# Patient Record
Sex: FEMALE | Race: WHITE
[De-identification: names, ages, dates, MRNs, and addresses within clinical notes are randomized per-mention and may not be internally consistent; named-entity substitution may affect disease eponyms.]

---

## 2018-03-19 ENCOUNTER — HOSPITAL ENCOUNTER (INPATIENT)
Dept: HOSPITAL 25 - ED | Age: 83
LOS: 3 days | Discharge: NURSING, REHAB OR LONG-TERM CARE | DRG: 57 | End: 2018-03-22
Attending: HOSPITALIST | Admitting: HOSPITALIST
Payer: SELF-PAY

## 2018-03-19 ENCOUNTER — HOSPITAL ENCOUNTER (EMERGENCY)
Dept: HOSPITAL 25 - UCEAST | Age: 83
Discharge: HOME | End: 2018-03-19
Payer: SELF-PAY

## 2018-03-19 VITALS — DIASTOLIC BLOOD PRESSURE: 94 MMHG | SYSTOLIC BLOOD PRESSURE: 190 MMHG

## 2018-03-19 DIAGNOSIS — I10: ICD-10-CM

## 2018-03-19 DIAGNOSIS — Z87.891: ICD-10-CM

## 2018-03-19 DIAGNOSIS — I16.0: Primary | ICD-10-CM

## 2018-03-19 DIAGNOSIS — G30.9: Primary | ICD-10-CM

## 2018-03-19 DIAGNOSIS — F03.90: ICD-10-CM

## 2018-03-19 DIAGNOSIS — R32: ICD-10-CM

## 2018-03-19 DIAGNOSIS — N39.0: ICD-10-CM

## 2018-03-19 DIAGNOSIS — I48.0: ICD-10-CM

## 2018-03-19 DIAGNOSIS — I16.0: ICD-10-CM

## 2018-03-19 DIAGNOSIS — R15.9: ICD-10-CM

## 2018-03-19 DIAGNOSIS — F02.81: ICD-10-CM

## 2018-03-19 DIAGNOSIS — Z81.8: ICD-10-CM

## 2018-03-19 LAB
BASOPHILS # BLD AUTO: 0 10^3/UL (ref 0–0.2)
EOSINOPHIL # BLD AUTO: 0.1 10^3/UL (ref 0–0.6)
HCT VFR BLD AUTO: 41 % (ref 35–47)
HGB BLD-MCNC: 14.1 G/DL (ref 12–16)
INR PPP/BLD: 1.01 (ref 0.77–1.02)
LYMPHOCYTES # BLD AUTO: 1.5 10^3/UL (ref 1–4.8)
MCH RBC QN AUTO: 31 PG (ref 27–31)
MCHC RBC AUTO-ENTMCNC: 34 G/DL (ref 31–36)
MCV RBC AUTO: 90 FL (ref 80–97)
MONOCYTES # BLD AUTO: 0.3 10^3/UL (ref 0–0.8)
NEUTROPHILS # BLD AUTO: 3.1 10^3/UL (ref 1.5–7.7)
NRBC # BLD AUTO: 0 10^3/UL
NRBC BLD QL AUTO: 0
PLATELET # BLD AUTO: 144 10^3/UL (ref 150–450)
RBC # BLD AUTO: 4.59 10^6/UL (ref 4–5.4)
WBC # BLD AUTO: 5 10^3/UL (ref 3.5–10.8)

## 2018-03-19 PROCEDURE — 80053 COMPREHEN METABOLIC PANEL: CPT

## 2018-03-19 PROCEDURE — G0463 HOSPITAL OUTPT CLINIC VISIT: HCPCS

## 2018-03-19 PROCEDURE — 93306 TTE W/DOPPLER COMPLETE: CPT

## 2018-03-19 PROCEDURE — 99284 EMERGENCY DEPT VISIT MOD MDM: CPT

## 2018-03-19 PROCEDURE — 93005 ELECTROCARDIOGRAM TRACING: CPT

## 2018-03-19 PROCEDURE — 81003 URINALYSIS AUTO W/O SCOPE: CPT

## 2018-03-19 PROCEDURE — 36415 COLL VENOUS BLD VENIPUNCTURE: CPT

## 2018-03-19 PROCEDURE — 84484 ASSAY OF TROPONIN QUANT: CPT

## 2018-03-19 PROCEDURE — 87086 URINE CULTURE/COLONY COUNT: CPT

## 2018-03-19 PROCEDURE — 84443 ASSAY THYROID STIM HORMONE: CPT

## 2018-03-19 PROCEDURE — 99202 OFFICE O/P NEW SF 15 MIN: CPT

## 2018-03-19 PROCEDURE — 83735 ASSAY OF MAGNESIUM: CPT

## 2018-03-19 PROCEDURE — 71045 X-RAY EXAM CHEST 1 VIEW: CPT

## 2018-03-19 PROCEDURE — 85730 THROMBOPLASTIN TIME PARTIAL: CPT

## 2018-03-19 PROCEDURE — 85025 COMPLETE CBC W/AUTO DIFF WBC: CPT

## 2018-03-19 PROCEDURE — 85610 PROTHROMBIN TIME: CPT

## 2018-03-19 NOTE — RAD
INDICATION: Chest pain     



COMPARISON: None

 

TECHNIQUE: An AP portable view obtained at 2025 hours is submitted.



FINDINGS: 



Bones/Soft Tissues: There are no acute bony findings.    



Cardiomediastinal: The cardiomediastinal silhouette is normal. 



Lungs: There are no infiltrates.



Pleura: There are no pleural effusions. 



Other: None



IMPRESSION:  NO ACTIVE DISEASE.

## 2018-03-19 NOTE — ED
Isadora HERNANDES Thomas, scribed for Howard Abbasi MD on 03/19/18 at 1941 .





HPI Chest Pain





- HPI Summary


HPI Summary: 





The patient is an 82 year old female transferred from urgent care with chest 

pains. It is unclear when the chest pain began. In the emergency department, 

the patient denies any chest pain. At baseline, the patient is confused due to 

Alzheimers, and the history is obtained from her daughter. At urgent care, the 

patient was found to have elevated blood pressure and a UTI, and she was 

transferred to the emergency department. Per daughter, the patient has not been 

to a physician in 25 years. Her past medical history is unknown. The family 

requests a social work consult. 





LEVEL 5 CAVEAT: HPI limited by dementia. 





- History of Current Complaint


Chief Complaint: EDUrogenitalProblems


Time Seen by Provider: 03/19/18 19:27


Hx Obtained From: Family/Caretaker


Hx From Patient Unobtainable Due To: Dementia


Onset/Duration: Resolved


Current Severity: None


Pain Intensity: 0


Pain Scale Used: 0-10 Numeric


Alleviating Factor(s): Spontaneous Resolution


Associated Signs and Symptoms: Positive: Chest Pain.  Negative: Fever





- Allergy/Home Medications


Allergies/Adverse Reactions: 


 Allergies











Allergy/AdvReac Type Severity Reaction Status Date / Time


 


No Known Allergies Allergy   Verified 03/19/18 16:25














PMH/Surg Hx/FS Hx/Imm Hx


Cardiovascular History: Reports: Hx Hypertension


Neurological History: Reports: Hx Dementia


Infectious Disease History: No


Infectious Disease History: 


   Denies: Traveled Outside the [place] in Last 30 Days





- Family History


Known Family History: Positive: Unknown - LEVEL 5 CAVEAT: HPI limited by 

dementia





- Social History


Alcohol Use: None


Substance Use Type: Reports: None


Hx Tobacco Use: Yes


Smoking Status (MU): Former Smoker





Review of Systems


Negative: Fever


Positive: Chest Pain


All Other Systems Reviewed And Are Negative: No





- Comments


Additional Review of Systems Comments: 





LEVEL 5 CAVEAT: ROS limited by dementia. 





Physical Exam





- Summary


Physical Exam Summary: 





VITAL SIGNS: Reviewed.


GENERAL: Patient is an elderly female who is lying comfortable in the stretcher 

in no acute distress. She is a poor historian. Patient is not in any acute 

respiratory distress.


HEAD AND FACE: No signs of trauma. No ecchymosis, hematomas or skull 

depressions. No sinus tenderness.


EYES: PERRLA, EOMI x 2, No injected conjunctiva, no nystagmus.


EARS: Hearing grossly intact. Ear canals and tympanic membranes are within 

normal limits.


MOUTH: Oropharynx within normal limits.


NECK: Supple, trachea is midline, no adenopathy, no JVD, no carotid bruit, no c-

spine tenderness, neck with full ROM.


CHEST: Symmetric, no tenderness at palpation


LUNGS: Clear to auscultation bilaterally. No wheezing or crackles.


CVS: Regular rate and rhythm, S1 and S2 present, no murmurs or gallops 

appreciated.


ABDOMEN: Soft, non-tender. No signs of distention. No rebound no guarding, and 

no masses palpated. Bowel sounds are normal.


EXTREMITIES: She has trace pitting edema bilaterally. FROM in all major joints, 

no cyanosis or clubbing.


NEURO: Alert and oriented to person only. No acute neurological deficits. 

Speech is normal and follows commands.


SKIN: Dry and warm





LEVEL 5 CAVEAT: physical exam limited by dementia. 


Triage Information Reviewed: Yes


Vital Signs On Initial Exam: 


 Initial Vitals











Temp Pulse Resp BP Pulse Ox


 


 98.3 F   64   16   193/89   99 


 


 03/19/18 19:04  03/19/18 19:04  03/19/18 19:04  03/19/18 19:04  03/19/18 19:04











Vital Signs Reviewed: Yes


Completion Of Physical Exam Limited Due To: Dementia





Diagnostics





- Vital Signs


 Vital Signs











  Temp Pulse Resp BP Pulse Ox


 


 03/19/18 19:31   68    98


 


 03/19/18 19:04  98.3 F  64  16  193/89  99














- Laboratory


Result Diagrams: 


 03/19/18 20:13





 03/19/18 20:13


Lab Statement: Any lab studies that have been ordered have been reviewed, and 

results considered in the medical decision making process.





- Radiology


  ** CXR


Xray Interpretation: No Acute Changes - NO ACTIVE DISEASE. Dr. Abbasi has 

reviewed this report.


Radiology Interpretation Completed By: Radiologist





- EKG


  ** 19:54


Cardiac Rate: NL


EKG Rhythm: Sinus Rhythm - at 67 BPM


EKG Interpretation: Nonspecific T waves in inferior leads. 





Re-Evaluation





- Re-Evaluation


  ** First Eval


Re-Evaluation Time: 21:41


Comment: Discussed results. Patient will be admitted.





Chest Pain Course/Dx





- Course


Assessment/Plan: The patient is an 82 year old female with a history of 

dementia transferred from urgent care with chest pains. In the ED course the 

patient was given ASA and Labetalol. Bloodwork and urinalysis were obtained. 

EKG and CXR were obtained. The patient is diagnosed with hypertension and chest 

pain. She will be admitted by Dr. iSgala.





- Diagnoses


Provider Diagnoses: 


 Hypertension, Chest pain








- Provider Notifications


Discussed Care Of Patient With: Nora Sigala


Time Discussed With Above Provider: 21:40


Instructed by Provider To: Admit As Inpatient





Discharge





- Discharge Plan


Condition: Stable


Disposition: ADMITTED TO [facility]


Referrals: 


No Primary Care Phys,NOPCP [Primary Care Provider] - 





The documentation as recorded by the Isadora hein Thomas accurately reflects 

the service I personally performed and the decisions made by me, Howard Abbasi MD.

## 2018-03-20 RX ADMIN — AMLODIPINE BESYLATE SCH MG: 5 TABLET ORAL at 00:17

## 2018-03-20 RX ADMIN — ASPIRIN SCH MG: 81 TABLET, COATED ORAL at 08:27

## 2018-03-20 RX ADMIN — Medication SCH MG: at 23:30

## 2018-03-20 RX ADMIN — HEPARIN SODIUM SCH UNITS: 5000 INJECTION INTRAVENOUS; SUBCUTANEOUS at 05:29

## 2018-03-20 RX ADMIN — HEPARIN SODIUM SCH UNITS: 5000 INJECTION INTRAVENOUS; SUBCUTANEOUS at 14:09

## 2018-03-20 RX ADMIN — HEPARIN SODIUM SCH UNITS: 5000 INJECTION INTRAVENOUS; SUBCUTANEOUS at 21:29

## 2018-03-20 RX ADMIN — AMLODIPINE BESYLATE SCH MG: 5 TABLET ORAL at 08:27

## 2018-03-20 NOTE — HP
HISTORY AND PHYSICAL:

 

DATE OF ADMISSION:  18

 

TIME OF EVALUATION:  0.

 

PRIMARY CARE PROVIDER:  The patient does not have a primary care physician.

 

CHIEF COMPLAINT:  Concern for inability to be cared for at home.

 

HISTORY OF PRESENT ILLNESS:  This is an 82-year-old female with advanced 
Alzheimer's who presented to the emergency room from urgent care for concern of 
urosepsis, hypertensive urgency, incoordination of nursing home placement.  The 
family has provided the history.  The daughter is at the bedside as the patient 
is unable to provide this due to her advanced dementia.  The patient lives with 
her handicapped son.  The son called because the patient was complaining of 
chest pain. The daughter came over.  She appeared sweaty, complaining of chest 
pain at that time.  They brought her to urgent care. They noted that her blood 
pressure was elevated.  They were concerned about a urinary tract infection.  
They sent her the emergency room for further evaluation.  On my encounter, the 
patient any pain.  No shortness of breath. She was alert and oriented x1.  They 
state she has not seen a doctor in years that she has lost a significant amount 
for weight since she has been diagnosed with Alzheimer's.  In terms of her 
Alzheimer's, she does not recognize anyone.  She is unable to express her 
needs.  She does have trouble with eating.  They have to get soft food for her.
  No recent URI illness.  No fever.  No nausea, vomiting, or diarrhea per 
family.  I discussed with the family about them taking her home and caring for 
her.  They state they are unable to as they have to care of the son as well.  
Otherwise, unable to obtain review of systems due to the patient's advanced 
dementia.

 

PAST MEDICAL HISTORY:  History of Alzheimer's disease, appears to be advanced.

 

MEDICATIONS:  None.

 

FAMILY HISTORY:  Mother  from dementia.  Father  from unknown 
cause.

 

SOCIAL HISTORY:  She lives at home with her son who has a developmental 
disability. She does ambulate, but unable to express her needs.  She is 
incontinent of stool and urine.  She quit smoking as the family states she 
forgot to continue this.  She used 2 to 3 packs per day for many years.  No 
alcohol use.  She does not have a healthcare proxy.  The daughter has been in 
the process of trying to pursue this. Her name is Rita Suarez.  Code status as 
of now is full code.

 

REVIEW OF SYSTEMS:  Unable to obtain, limited due to patient's dementia.

 

                               PHYSICAL EXAMINATION

 

GENERAL:  No acute distress, resting comfortably with her family at the bedside.

 

VITAL SIGNS:  Temp 98.3, pulse  is 68, respiratory rate is 16, oxygen 
saturation is 97% on room air, blood pressure 155/65.

 

HEENT:  Head normocephalic.  Pupils equal and reactive, anicteric.  Oropharynx: 
Mucous membranes moist.

 

NECK:  Supple, no lymphadenopathy.

 

RESPIRATORY:  Diminished breath sounds.  No wheezes, rhonchi, or rales.

 

CARDIAC:  Regular rate and rhythm.  Soft systolic murmur heard throughout.

 

ABDOMEN:  Soft, nontender, and nondistended.

 

EXTREMITIES:  +1 nonpitting edema, +1 DPs.  She has chronic nail onychomycosis 
in nails of both lower extremities.

 

NEUROLOGIC:  No gross focal neurologic deficits.  Alert and oriented x1.

 

 LABORATORY DATA:  White count is 5, hemoglobin 14.1, hematocrit 41, and  
platelets 144.  INR is 1.01.  Sodium 138, potassium 3.5, chloride 104, bicarb 30
, BUN 12, creatinine 0.76, glucose 156. Troponin is 0.  Albumin is 4.  
Urinalysis was unremarkable in the emergency room and in urgent care it shows 2
+ leukocytes.

 

RADIOGRAPHIC DATA:  Chest x-ray shows no active disease.  EKG shows normal 
sinus rhythm.

 

ASSESSMENT:  This is an 82-year-old female with advanced dementia  who presents 
to the emergency room from urgent care for concern of needing a higher level of 
care.

 

1.  Advanced dementia.  Assessment:  The patient's UA is unremarkable.  She 
does not have evidence of urosepsis as mentioned by urgent care.  She did have 
chest pain prior to arrival.  I do not think it is unreasonable to trend her 
troponin.  I would not pursue further workup.  We will place a social work 
consult to help with coordination of care and monitor her blood pressures.  We 
will hold off on starting her on an antihypertensive at this time.  We will 
start her on a baby aspirin.

2.  FEN:  Regular soft diet.

3.  DVT prophylaxis:  The patient scores high risk.  Placed her on heparin 
subcu t.i.d.

4.  Code status:  Right now is full code.  She needs her proxy filled out, so 
they can update her MOLST form.

 

TIME SPENT:  Greater than 45 minutes spent doing history and physical, more 
than half time spent in direct patient contact.

 

[id number]/CPS #: [id number]

NORMAN

## 2018-03-20 NOTE — PN
Subjective


Date of Service: 03/20/18


Interval History: 





She has been active this morning, tries to get out of bed frequently, but she 

has no complaints.  She does not know where she is, and when I tell her she is 

in the hospital, she does not know why.  She feels good and requests breakfast.

  No chest pain, shortness of breath, nausea, abdominal pain, fevers.





Objective


Active Medications: 








Acetaminophen (Tylenol Tab*)  650 mg PO Q4H PRN


   PRN Reason: FEVER/PAIN


Al Hydrox/Mg Hydrox/Simethicone (Maalox Plus*)  30 ml PO Q6H PRN


   PRN Reason: INDIGESTION


Amlodipine Besylate (Norvasc Tab*)  5 mg PO DAILY [facility]


   Last Admin: 03/20/18 08:27 Dose:  5 mg


Aspirin (Aspirin Ec Low Dose*)  81 mg PO DAILY [facility]


   Last Admin: 03/20/18 08:27 Dose:  81 mg


Docusate Sodium (Colace Cap*)  100 mg PO BID PRN


   PRN Reason: CONSTIPATION


Heparin Sodium (Porcine) (Heparin Vial(*))  5,000 units SUBCUT Q8HR [facility]


   Last Admin: 03/20/18 05:29 Dose:  5,000 units


Ondansetron HCl (Zofran Inj*)  4 mg IV Q4H PRN


   PRN Reason: NAUSEA/VOMITING


Senna (Senokot Tab*)  1 tab PO BID PRN


   PRN Reason: CONSTIPATION








 Vital Signs - 8 hr











  03/20/18 03/20/18





  03:18 07:43


 


Temperature 96.6 F 97.5 F


 


Pulse Rate 55 51


 


Respiratory 16 18





Rate  


 


Blood Pressure 108/46 120/48





(mmHg)  


 


O2 Sat by Pulse 100 99





Oximetry  











Oxygen Devices in Use Now: None


Appearance: alert, comfortable, male-pattern facial hair


Eyes: No Scleral Icterus


Ears/Nose/Mouth/Throat: - - poor dentition


Neck: NL Appearance and Movements; NL JVP


Respiratory: Symmetrical Chest Expansion and Respiratory Effort, Clear to 

Auscultation


Cardiovascular: NL Sounds; No Murmurs; No JVD, RRR


Abdominal: NL Sounds; No Tenderness; No Distention


Lymphatic: No Cervical Adenopathy


Extremities: No Edema


Skin: No Rash or Ulcers


Neurological: - - oriented only to person, cannot tell me the date, place, or 

her family members' names.  follows simple commands, poor short term and long 

term recall.


Result Diagrams: 


 03/19/18 20:13





 03/19/18 20:13





Assess/Plan/Problems-Billing


Assessment: 





83 yo female with history of alzheimer dementia who is cared for by her son who 

has special needs, presented from urgent care with concern for hypertensive 

urgency and inability to care for herself at home. 





- Patient Problems


(1) Alzheimer's dementia with behavioral disturbance


Current Visit: Yes   Status: Acute   Code(s): G30.9 - ALZHEIMER'S DISEASE, 

UNSPECIFIED; F02.81 - DEMENTIA IN OTH DISEASES CLASSD ELSWHR W BEHAVIORAL 

DISTURB   SNOMED Code(s): 4040099577660


   Comment: appears advanced.  her daughter reported to the ED that she is 

unable to care for herself at home at this time, need to discuss this with her.

  CM to pursue nursing home placement.    





(2) Hypertensive urgency


Current Visit: Yes   Status: Acute   Code(s): I16.0 - HYPERTENSIVE URGENCY   

SNOMED Code(s): 164228944


   Comment: she received labetalol 10mg IV in the ED; responded very quickly--

will not give any more iv antihypertensives given her sensitivity to them


she is normotensive now 


no evidence of end-organ damage   





(3) HTN (hypertension)


Current Visit: Yes   Status: Acute   Code(s): I10 - ESSENTIAL (PRIMARY) 

HYPERTENSION   SNOMED Code(s): 43580826


   Comment: amlodipine begun last night; will follow

## 2018-03-21 RX ADMIN — ASPIRIN SCH: 81 TABLET, COATED ORAL at 08:58

## 2018-03-21 RX ADMIN — HEPARIN SODIUM SCH UNITS: 5000 INJECTION INTRAVENOUS; SUBCUTANEOUS at 05:38

## 2018-03-21 RX ADMIN — Medication SCH MG: at 20:49

## 2018-03-21 RX ADMIN — HEPARIN SODIUM SCH UNITS: 5000 INJECTION INTRAVENOUS; SUBCUTANEOUS at 13:45

## 2018-03-21 RX ADMIN — AMLODIPINE BESYLATE SCH: 5 TABLET ORAL at 08:58

## 2018-03-21 RX ADMIN — HEPARIN SODIUM SCH: 5000 INJECTION INTRAVENOUS; SUBCUTANEOUS at 20:56

## 2018-03-21 NOTE — PN
Subjective


Date of Service: 03/21/18


Interval History: 





she was noted to be in atrial fibrillation overnight.  no rate control was 

required, and she returned to normal sinus rhythm spontaneously.  she has no 

complaints, says she just wants to "get home."  





Objective


Active Medications: 








Acetaminophen (Tylenol Tab*)  650 mg PO Q4H PRN


   PRN Reason: FEVER/PAIN


Al Hydrox/Mg Hydrox/Simethicone (Maalox Plus*)  30 ml PO Q6H PRN


   PRN Reason: INDIGESTION


Amlodipine Besylate (Norvasc Tab*)  5 mg PO DAILY [facility]


   Last Admin: 03/21/18 08:58 Dose:  Not Given


Aspirin (Aspirin Ec Low Dose*)  81 mg PO DAILY [facility]


   Last Admin: 03/21/18 08:58 Dose:  Not Given


Docusate Sodium (Colace Cap*)  100 mg PO BID PRN


   PRN Reason: CONSTIPATION


Heparin Sodium (Porcine) (Heparin Vial(*))  5,000 units SUBCUT Q8HR [facility]


   Last Admin: 03/21/18 13:45 Dose:  5,000 units


Melatonin (Melatonin (Nf))  3 mg PO BEDTIME [facility]


   Last Admin: 03/20/18 23:30 Dose:  3 mg


Ondansetron HCl (Zofran Inj*)  4 mg IV Q4H PRN


   PRN Reason: NAUSEA/VOMITING


Senna (Senokot Tab*)  1 tab PO BID PRN


   PRN Reason: CONSTIPATION








 Vital Signs - 8 hr











  03/21/18 03/21/18 03/21/18





  08:53 09:36 11:23


 


Temperature 97.0 F  97.9 F


 


Pulse Rate 67  64


 


Respiratory 18 18 16





Rate   


 


Blood Pressure 145/54  136/74





(mmHg)   


 


O2 Sat by Pulse 99  99





Oximetry   











Oxygen Devices in Use Now: None


Appearance: alert, sitting up in bed eating lunch, disoriented


Eyes: No Scleral Icterus


Ears/Nose/Mouth/Throat: NL Teeth, Lips, Gums


Neck: NL Appearance and Movements; NL JVP


Respiratory: Symmetrical Chest Expansion and Respiratory Effort, Clear to 

Auscultation


Cardiovascular: NL Sounds; No Murmurs; No JVD, RRR


Abdominal: NL Sounds; No Tenderness; No Distention


Lymphatic: No Cervical Adenopathy


Extremities: No Edema


Skin: No Rash or Ulcers


Neurological: - - oriented only to person


Result Diagrams: 


 03/19/18 20:13





 03/19/18 20:13





Assess/Plan/Problems-Billing


Assessment: 





81 yo female with history of alzheimer dementia who is cared for by her son who 

has special needs, presented from urgent care with concern for hypertensive 

urgency and inability to care for herself at home. 





- Patient Problems


(1) Alzheimer's dementia with behavioral disturbance


Current Visit: Yes   Status: Acute   Code(s): G30.9 - ALZHEIMER'S DISEASE, 

UNSPECIFIED; F02.81 - DEMENTIA IN OTH DISEASES CLASSD ELSWHR W BEHAVIORAL 

DISTURB   SNOMED Code(s): 1545290134966


   Comment: plan for [facility] tomorrow; she is unable to go back home 

because she cannot care for herself   





(2) Hypertensive urgency


Current Visit: Yes   Status: Acute   Code(s): I16.0 - HYPERTENSIVE URGENCY   

SNOMED Code(s): 555239725


   Comment: received labetalol 10mg IV in the ed and was very sensitive to it


she is normotensive now 


no evidence of end-organ damage   





(3) Paroxysmal atrial fibrillation


Current Visit: Yes   Status: Acute   Code(s): I48.0 - PAROXYSMAL ATRIAL 

FIBRILLATION   SNOMED Code(s): 102321687


   Comment: resolved this morning. I have discussed this with her daughter and 

son-in-law and the risk of cva.  I also discussed risks and benefits of 

anticoagulation with them.  They are unsure, so I will check a TTE to help 

define risks/benefits of anticoagulation.     





(4) HTN (hypertension)


Current Visit: Yes   Status: Acute   Code(s): I10 - ESSENTIAL (PRIMARY) 

HYPERTENSION   SNOMED Code(s): 86795701


   Comment: well controlled on amlodipine

## 2018-03-21 NOTE — PN
Progress Note





- Progress Note


Date of Service: 03/21/18


Note: 





Patient noted to be in Afib - confirmed by EKG.  Not a candidate for 

anticoagulation in setting of being a high fall risk with advanced dementia.  

Currently rate controlled.  Continue ASA 81 mg.

## 2018-03-22 VITALS — DIASTOLIC BLOOD PRESSURE: 76 MMHG | SYSTOLIC BLOOD PRESSURE: 152 MMHG

## 2018-03-22 RX ADMIN — ASPIRIN SCH MG: 81 TABLET, COATED ORAL at 09:05

## 2018-03-22 RX ADMIN — HEPARIN SODIUM SCH: 5000 INJECTION INTRAVENOUS; SUBCUTANEOUS at 06:34

## 2018-03-22 RX ADMIN — AMLODIPINE BESYLATE SCH MG: 5 TABLET ORAL at 09:04

## 2018-03-22 NOTE — DS
DATE OF ADMISSION:  03/19/2018.

 

DATE OF DISCHARGE:  03/22/2018.

 

PRINCIPAL DISCHARGE DIAGNOSES:

1.  Advanced dementia.

2.  Atrial fibrillation.

 

PHYSICAL EXAMINATION AT THE TIME OF DISCHARGE: General:  Alert, elderly female 
in no distress, walking the hallway frequently, oriented to self only.  Vital 
Signs:  Temperature 97.5, heart rate 66, blood pressure 152/76, pulse ox 100 
percent on room air, respiratory rate 16.  HEENT: Pupils 3 mm bilaterally and 
reactive to light.  No nystagmus.  Moist mucosa.  No pharyngeal exudates or 
erythema.  Neck:  No JVP, no cervical lymphadenopathy. Chest:  Regular rate and 
rhythm. No murmurs. PMI nondisplaced.  Lungs: Clear bilaterally.  Abdomen: Soft
, nontender, nondistended. Extremities:  No edema, no rashes, no ulcers.  
Neurologic:  Strength 5+ throughout.  Follows simple commands. Coordination 
intact.

 

HOSPITAL COURSE BY PROBLEM:

1.  Advanced dementia:  Ms. Munoz was admitted to the hospital due to her 
family's inability to care for her at home.  She lived only with her son who 
has special needs and her daughter reported that she was no longer safe at home 
and that their family could not offer the level of care that she required.  She 
was admitted under [facility] admission and is being discharged to [facility].

 

2.  Hypertension:  She was found to be in hypertensive urgency in the emergency 
department.  She responded very sensitively to Labetalol 10 mg IV.  She was 
started on Amlodipine 5 mg daily and has had good control with this 
prescription which is new for her.  She is being discharged on Amlodipine 5 mg 
daily.

 

3.  New onset atrial fibrillation:  She was noted to be in A-fib with RVR on 
telemetry during this admission.  She has never been found to have A-fib or 
told that she had is before.  She did require IV Metoprolol, but converted to 
normal sinus rhythm on her own.  This was discussed at length with her daughter
, Rita, and her risk for stroke.  An echocardiogram was performed that showed 
no valvular abnormalities.  Her daughter, Rita, reports that Ms. Munoz's 
livelihood is based heavily on her activity level while she is in the hospital, 
even she is witnessed to be doing laps around the hallways all day long further 
confirming that her ambulation is very important to her.  Her daughter and I 
discussed that a stroke could be devastating to Ms. Munoz.  We also 
extensively discussed the risks of bleeding, especially in an elderly female 
with advanced dementia; however, at this time, given her elevated CHADS-2 VASc 
score and low HAS-BLED score, her daughter has elected to continue with 
Eliquis.  She qualifies for low dose Eliquis at 2.5 mg b.i.d. and will be 
discharged on this medications which is new for her.  She required no rate 
controlling medications after she converted.

 

4.  Disposition:  Ms. Munoz is being discharged to [facility] on 03
/22/2018 with her daughter Rita at the bedside.

 

FOLLOW-UP NEEDED:  Ms. Munoz should be re-evaluated regularly for the safety 
of a novel oral anticoagulant and the risks versus benefits should be routinely 
readdressed with her family as her dementia advances.  This has been discussed 
with her daughter.

 

 [id number]/CPS #: [id number]

MTDD

## 2021-09-17 NOTE — UC
Complaint Female HPI





- HPI Summary


HPI Summary: 





83 yo WF h/o Alzheimer's and HTN looked after by her grandson c/o lower abd 

pain in UC today. Per daughter (who does not live with her) and son in law, pt 

reportedly also c/o CP AND abd pains. Pt wears depends and is unaware of her 

hydration status. According to her daughter pt is poorly looked after with a 

handicapped son at home and a son in law who is only able to come see her 

intermittently to see if she ate or not. Has h/o urinary incontinence and "

wears depends" but not today for some reason.





- History Of Current Complaint


Chief Complaint: UCChestPain


Stated Complaint: CHEST PAIN


Time Seen by Provider: 03/19/18 16:37


Hx Obtained From: Patient


Hx From Patient Unobtainable Due To: Other


Pregnant?: No


Onset/Duration: Lasting Days


Severity Initially: Moderate


Severity Currently: Moderate


Pain Intensity: 5


Character: Dull





- Allergies/Home Medications


Allergies/Adverse Reactions: 


 Allergies











Allergy/AdvReac Type Severity Reaction Status Date / Time


 


No Known Allergies Allergy   Verified 03/19/18 16:25











Home Medications: 


 Home Medications





NK [No Home Medications Reported]  03/19/18 [History Confirmed 03/19/18]











PMH/Surg Hx/FS Hx/Imm Hx


Previously Healthy: Yes


Cardiovascular History: Hypertension





- Surgical History


Surgical History: None





- Social History


Alcohol Use: None


Substance Use Type: None


Smoking Status (MU): Former Smoker





Review of Systems


Constitutional: Negative


Skin: Negative


Eyes: Negative


ENT: Negative


Respiratory: Negative


Cardiovascular: Negative


Gastrointestinal: Negative


Genitourinary: Other - lower abd pains


Motor: Negative


Neurovascular: Negative


Musculoskeletal: Negative


Neurological: Negative


Psychological: Negative


All Other Systems Reviewed And Are Negative: Yes





Physical Exam


Triage Information Reviewed: Yes


Completion Of Physical Exam Limited Due To: Dementia


Appearance: No Pain Distress


Vital Signs: 


 Initial Vital Signs











Temp  37.3 C   03/19/18 16:25


 


Pulse  70   03/19/18 16:25


 


Resp  18   03/19/18 16:25


 


BP  188/89   03/19/18 16:25


 


Pulse Ox  100   03/19/18 16:25











Eye Exam: Normal


ENT Exam: Normal


ENT: Positive: Pharynx normal


Dental Exam: Normal


Neck exam: Normal


Neck: Positive: 1


Respiratory Exam: Normal


Cardiovascular Exam: Normal


Abdomen Description: Positive: Other: - suprapubic tenderness


Musculoskeletal Exam: Normal


Neurological Exam: Normal


Psychological Exam: Normal


Skin Exam: Normal





 Complaint Female Dx





- Course


Course Of Treatment: Pt lives with a handicapped son who is not able to take 

care of the pt with h/o Alzheimer's with urinary incontinence, SBP today in 190'

s manually, EKG with sinus arrythmia but w/o STT changes or TWI.  Pt in chronic 

neglect, Needs to go to ED for workup of HTN urgency, a positive UA which will 

require monitoring of pt clinically for urosepsis and most importantly  for 

inpt admission and coordination with  for permanent NH placement.  

Called ER and informed ER provider Edel that pt is being driven via private 

car to ER by her family.





- Differential Dx/Diagnosis


Provider Diagnoses: Hypertensive urgency.  UTI





Discharge





- Discharge Plan


Condition: Stable


Disposition: HOME


Patient Education Materials:  Urinary Tract Infection in Women (ED)


Referrals: 


No Primary Care Phys,NOPCP [Primary Care Provider] - 


Additional Instructions: 


please Go to ER ASAP
no